# Patient Record
Sex: MALE | Race: WHITE | Employment: FULL TIME | URBAN - METROPOLITAN AREA
[De-identification: names, ages, dates, MRNs, and addresses within clinical notes are randomized per-mention and may not be internally consistent; named-entity substitution may affect disease eponyms.]

---

## 2017-06-05 PROBLEM — F33.9 RECURRENT DEPRESSION (HCC): Status: ACTIVE | Noted: 2017-06-05

## 2017-06-05 PROBLEM — J30.2 SEASONAL ALLERGIC RHINITIS, UNSPECIFIED ALLERGIC RHINITIS TRIGGER: Status: ACTIVE | Noted: 2017-06-05

## 2017-06-05 PROBLEM — J45.20 MILD INTERMITTENT ASTHMA WITHOUT COMPLICATION: Status: ACTIVE | Noted: 2017-06-05

## 2017-07-12 PROBLEM — E80.4 GILBERT SYNDROME: Status: ACTIVE | Noted: 2017-07-12

## 2017-09-11 ENCOUNTER — APPOINTMENT (OUTPATIENT)
Dept: OTHER | Facility: HOSPITAL | Age: 42
End: 2017-09-11
Attending: PREVENTIVE MEDICINE

## 2019-11-11 PROBLEM — R68.82 LOSS OF LIBIDO: Status: ACTIVE | Noted: 2019-11-11

## 2019-11-11 PROBLEM — F41.9 ANXIETY: Status: ACTIVE | Noted: 2019-11-11

## 2021-04-09 DIAGNOSIS — Z23 NEED FOR VACCINATION: ICD-10-CM

## 2021-04-15 ENCOUNTER — HOSPITAL ENCOUNTER (OUTPATIENT)
Dept: CT IMAGING | Age: 46
Discharge: HOME | End: 2021-04-15
Attending: FAMILY MEDICINE

## 2021-04-15 DIAGNOSIS — Z13.9 SCREENING FOR CONDITION: ICD-10-CM

## 2021-05-07 PROBLEM — Z82.49 FAMILY HISTORY OF BRAIN ANEURYSM: Status: ACTIVE | Noted: 2021-05-07

## 2021-05-24 PROBLEM — I67.1 ANEURYSM OF INTERNAL CAROTID ARTERY: Status: ACTIVE | Noted: 2021-05-24

## 2021-08-17 ENCOUNTER — OFFICE VISIT (OUTPATIENT)
Dept: SURGERY | Facility: CLINIC | Age: 46
End: 2021-08-17
Payer: COMMERCIAL

## 2021-08-17 VITALS
HEIGHT: 73 IN | WEIGHT: 160 LBS | SYSTOLIC BLOOD PRESSURE: 102 MMHG | BODY MASS INDEX: 21.2 KG/M2 | DIASTOLIC BLOOD PRESSURE: 72 MMHG

## 2021-08-17 DIAGNOSIS — I72.9 ANEURYSM (HCC): Primary | ICD-10-CM

## 2021-08-17 PROCEDURE — 99203 OFFICE O/P NEW LOW 30 MIN: CPT | Performed by: RADIOLOGY

## 2021-08-17 PROCEDURE — 3078F DIAST BP <80 MM HG: CPT | Performed by: RADIOLOGY

## 2021-08-17 PROCEDURE — 3074F SYST BP LT 130 MM HG: CPT | Performed by: RADIOLOGY

## 2021-08-17 PROCEDURE — 3008F BODY MASS INDEX DOCD: CPT | Performed by: RADIOLOGY

## 2021-08-17 NOTE — PROGRESS NOTES
[facility]  Interventional Neuroradiology Clinic Note    Naeem Hull MD  Primary Care      Date of Service: 8/17/2021    Dear Sirisha Cruz,     We had the pleasure of seeing Daniel Rios in the Interventional Neuroradiology Clinic at [facility]         Medications:    Current Outpatient Medications:   •  Sertraline HCl 25 MG Oral Tab, Take 1 tablet (25 mg total) by mouth daily. , Disp: 30 tablet, Rfl: 0  •  Fluocinolone Acetonide 0.01 % External Solution, Apply thin layer to affect upper and lower extremities. There is no pronator drift on [place] testing. Romberg testing is negative. There is no evidence of nystagmus, ataxia, dysarthria, or dysmetria with finger-to-nose testing. Imaging:  All imaging reviewed with Dr. Gume Wilhelm interventional treatment options. The patient agreed with our plan for conservative medical management and serial imaging monitoring.     Finally, we informed that patient to seek emergent evaluation if encountering any severe headache or new neurological d

## 2021-08-17 NOTE — PROGRESS NOTES
Patient is here for consult on an aneurysm. His mom passed away a few months ago from [employer]" and his PCP did a CT and aneurysm was found. He has tinnitus in both ears, R>L since 1994 after attending a concert.  Minor occasional feeling of being \

## 2021-09-03 ENCOUNTER — TELEPHONE (OUTPATIENT)
Dept: SURGERY | Facility: CLINIC | Age: 46
End: 2021-09-03

## 2021-09-03 NOTE — TELEPHONE ENCOUNTER
Per OV note on 9-2-21 Due to the small size of this lesion, we will recommend serial imaging monitoring with MRA head studies at 6 months (March 2022), 1 year, 2 years, and every 5 years.      Reminder placed

## 2022-02-14 ENCOUNTER — TELEPHONE (OUTPATIENT)
Dept: SURGERY | Facility: CLINIC | Age: 47
End: 2022-02-14

## 2022-02-14 DIAGNOSIS — I72.9 ANEURYSM (HCC): Primary | ICD-10-CM

## 2022-07-07 ENCOUNTER — WALK IN (OUTPATIENT)
Dept: URGENT CARE | Age: 47
End: 2022-07-07
Attending: FAMILY MEDICINE

## 2022-07-07 VITALS
OXYGEN SATURATION: 99 % | TEMPERATURE: 97.9 F | WEIGHT: 160 LBS | RESPIRATION RATE: 16 BRPM | HEART RATE: 70 BPM | SYSTOLIC BLOOD PRESSURE: 104 MMHG | DIASTOLIC BLOOD PRESSURE: 66 MMHG

## 2022-07-07 DIAGNOSIS — R21 RASH: Primary | ICD-10-CM

## 2022-07-07 PROCEDURE — 99202 OFFICE O/P NEW SF 15 MIN: CPT

## 2022-07-07 RX ORDER — DOXYCYCLINE HYCLATE 100 MG
100 TABLET ORAL 2 TIMES DAILY
Qty: 20 TABLET | Refills: 0 | Status: SHIPPED | OUTPATIENT
Start: 2022-07-07 | End: 2022-07-17

## 2022-07-07 ASSESSMENT — ENCOUNTER SYMPTOMS
CHILLS: 0
ABDOMINAL PAIN: 0
SHORTNESS OF BREATH: 0
COUGH: 0
SINUS PRESSURE: 0
DIARRHEA: 0
WEAKNESS: 0
SORE THROAT: 0
FEVER: 0
AGITATION: 0
VOMITING: 0
DIZZINESS: 0
NAUSEA: 0
EYE REDNESS: 0
ADENOPATHY: 0

## 2022-07-07 ASSESSMENT — PAIN SCALES - GENERAL
PAINLEVEL_OUTOF10: 0
PAINLEVEL: 1
PAINLEVEL_OUTOF10: 0